# Patient Record
Sex: FEMALE | URBAN - METROPOLITAN AREA
[De-identification: names, ages, dates, MRNs, and addresses within clinical notes are randomized per-mention and may not be internally consistent; named-entity substitution may affect disease eponyms.]

---

## 2023-05-23 ENCOUNTER — APPOINTMENT (OUTPATIENT)
Dept: MEDICAL GROUP | Facility: LAB | Age: 33
End: 2023-05-23

## 2023-06-16 ENCOUNTER — APPOINTMENT (RX ONLY)
Dept: URBAN - METROPOLITAN AREA CLINIC 15 | Facility: CLINIC | Age: 33
Setting detail: DERMATOLOGY
End: 2023-06-16

## 2023-06-16 DIAGNOSIS — L20.89 OTHER ATOPIC DERMATITIS: ICD-10-CM

## 2023-06-16 DIAGNOSIS — L30.4 ERYTHEMA INTERTRIGO: ICD-10-CM

## 2023-06-16 DIAGNOSIS — L60.3 NAIL DYSTROPHY: ICD-10-CM

## 2023-06-16 PROBLEM — L30.9 DERMATITIS, UNSPECIFIED: Status: ACTIVE | Noted: 2023-06-16

## 2023-06-16 PROCEDURE — ? ADDITIONAL NOTES

## 2023-06-16 PROCEDURE — ? PRESCRIPTION

## 2023-06-16 PROCEDURE — 99203 OFFICE O/P NEW LOW 30 MIN: CPT

## 2023-06-16 PROCEDURE — ? COUNSELING

## 2023-06-16 RX ORDER — TRIAMCINOLONE ACETONIDE 1 MG/G
1 OINTMENT TOPICAL BID
Qty: 30 | Refills: 1 | Status: ERX | COMMUNITY
Start: 2023-06-16

## 2023-06-16 RX ADMIN — TRIAMCINOLONE ACETONIDE 1: 1 OINTMENT TOPICAL at 00:00

## 2023-06-16 ASSESSMENT — LOCATION ZONE DERM
LOCATION ZONE: TOENAIL
LOCATION ZONE: TRUNK

## 2023-06-16 ASSESSMENT — LOCATION DETAILED DESCRIPTION DERM
LOCATION DETAILED: LEFT GREAT TOENAIL
LOCATION DETAILED: RIGHT INFRAMAMMARY CREASE (INNER QUADRANT)
LOCATION DETAILED: RIGHT MEDIAL BREAST 3-4:00 REGION
LOCATION DETAILED: LEFT MEDIAL BREAST 10-11:00 REGION
LOCATION DETAILED: LEFT INFRAMAMMARY CREASE (INNER QUADRANT)

## 2023-06-16 ASSESSMENT — LOCATION SIMPLE DESCRIPTION DERM
LOCATION SIMPLE: LEFT BREAST
LOCATION SIMPLE: LEFT GREAT TOE
LOCATION SIMPLE: RIGHT BREAST

## 2023-06-16 ASSESSMENT — SEVERITY ASSESSMENT 2020: SEVERITY 2020: MODERATE

## 2023-06-16 NOTE — PROCEDURE: ADDITIONAL NOTES
Additional Notes: Problem could be due to irritant from moisture from breast milk or possibly contact dermatitis from the ointment being applied\\nAdvised to change pads once moist\\n Stop natural product and only use Vaseline \\nApply steroid after feeding or wipe off prior to breast feeding
Render Risk Assessment In Note?: no
Detail Level: Detailed
Additional Notes: Likely from recent polish

## 2023-11-20 ENCOUNTER — OFFICE VISIT (OUTPATIENT)
Dept: MEDICAL GROUP | Facility: LAB | Age: 33
End: 2023-11-20
Payer: COMMERCIAL

## 2023-11-20 VITALS
RESPIRATION RATE: 16 BRPM | WEIGHT: 145.06 LBS | OXYGEN SATURATION: 99 % | DIASTOLIC BLOOD PRESSURE: 60 MMHG | HEIGHT: 68 IN | TEMPERATURE: 96.7 F | HEART RATE: 80 BPM | SYSTOLIC BLOOD PRESSURE: 118 MMHG | BODY MASS INDEX: 21.99 KG/M2

## 2023-11-20 DIAGNOSIS — Z78.9 VEGAN DIET: ICD-10-CM

## 2023-11-20 DIAGNOSIS — R76.8 ELEVATED ANTINUCLEAR ANTIBODY (ANA) LEVEL: ICD-10-CM

## 2023-11-20 DIAGNOSIS — Z13.29 SCREENING FOR THYROID DISORDER: ICD-10-CM

## 2023-11-20 DIAGNOSIS — Z13.220 LIPID SCREENING: ICD-10-CM

## 2023-11-20 DIAGNOSIS — Z83.49 FAMILY HISTORY OF HEMOCHROMATOSIS: ICD-10-CM

## 2023-11-20 DIAGNOSIS — Z76.89 ENCOUNTER TO ESTABLISH CARE: ICD-10-CM

## 2023-11-20 PROCEDURE — 99203 OFFICE O/P NEW LOW 30 MIN: CPT | Performed by: PHYSICIAN ASSISTANT

## 2023-11-20 PROCEDURE — 3074F SYST BP LT 130 MM HG: CPT | Performed by: PHYSICIAN ASSISTANT

## 2023-11-20 PROCEDURE — 3078F DIAST BP <80 MM HG: CPT | Performed by: PHYSICIAN ASSISTANT

## 2023-11-20 ASSESSMENT — PATIENT HEALTH QUESTIONNAIRE - PHQ9: CLINICAL INTERPRETATION OF PHQ2 SCORE: 0

## 2023-11-20 NOTE — PROGRESS NOTES
"Subjective:     CC:  There were no encounter diagnoses.    HISTORY OF THE PRESENT ILLNESS: Patient is a 33 y.o. female. This pleasant patient is here today to establish care. His/her prior PCP was Ladan Hernandez.    Gave Birth 2022    -vaginal delivery without complications  -still breast feeding       Family hx of Hemochromatosis  -both parents are carriers  -sister dx    Health Maintenance     - Dyslipidemia (30-45): ordered  - Diabetes (HTN, HLD, BMI >25): ordered  - Depression screening (PHQ-2 and/or PHQ-9): neg  - Dental: UTD  - Eye: due  Diet: Recent diet change - daughter has cow milk protein/soy allergy. Vegan and soy free diet  Exercise: sedentary, some walking   Sleep: altered due to breast feeding schedule  Substance Use: denies  Tobacco Use/counseling: denies  Safe in relationship: yes  Seat belts, bike helmet, gun safety discussed.  Sun protection used.       Cancer screening  - Cervical CA (21-65): due   -  HPV+ , clear on repeat pap   - Skin cancer screening: practices sun avoidance     Infectious disease screening/Immunizations  --STI/HIV Screening: completed  --Hepatitis C Screening (18 to 78 yo): completed  --Immunizations: UTD, will check records for Hep B      No current Epic-ordered outpatient medications on file.     No current Epic-ordered facility-administered medications on file.       Health Maintenance: Completed    ROS:   Gen: no fevers/chills, no changes in weight  Eyes: no changes in vision  ENT: no sore throat, no hearing loss, no bloody nose  Pulm: no sob, no cough  CV: no chest pain, no palpitations  GI: no nausea/vomiting, no diarrhea  : no dysuria  MSk: no myalgias  Skin: no rash  Neuro: no headaches, no numbness/tingling  Heme/Lymph: no easy bruising      Objective:       Exam: /60 (BP Location: Right arm, Patient Position: Sitting, BP Cuff Size: Adult)   Pulse 80   Temp 35.9 °C (96.7 °F) (Temporal)   Resp 16   Ht 1.727 m (5' 8\")   Wt 65.8 kg (145 " lb 1 oz)   SpO2 99%  Body mass index is 22.06 kg/m².    General: Normal appearing. No distress.  HEENT: Normocephalic. Eyes conjunctiva clear lids without ptosis, pupils equal and reactive to light accommodation, ears normal shape and contour, canals are clear bilaterally, tympanic membranes are benign, nasal mucosa benign, oropharynx is without erythema, edema or exudates.   Neck: Supple without JVD or bruit. Thyroid is not enlarged.  Pulmonary: Clear to ausculation.  Normal effort. No rales, ronchi, or wheezing.  Cardiovascular: Regular rate and rhythm without murmur. Carotid and radial pulses are intact and equal bilaterally.  Abdomen: Soft, nontender, nondistended. Normal bowel sounds. Liver and spleen are not palpable  Neurologic: Grossly nonfocal  Lymph: No cervical or supraclavicular lymph nodes are palpable  Skin: Warm and dry.  No obvious lesions.  Musculoskeletal: Normal gait. No extremity cyanosis, clubbing, or edema.  Psych: Normal mood and affect. Alert and oriented x3. Judgment and insight is normal.      Assessment & Plan:   33 y.o. female with the following -    1. Encounter to establish care  Labs per orders  Vaccinations per orders  Screenings per orders      2. Elevated antinuclear antibody (JACOBO) level  Chronic condition, previous autoimmune work-up negative  - JACOBO W/REFLEX IF POSITIVE    3. Family history of hemochromatosis  - IRON/TOTAL IRON BIND; Future  - FERRITIN; Future    4. Vegan diet  - VITAMIN D,25 HYDROXY (DEFICIENCY); Future  - VIT B12,  FOLIC ACID  - IRON/TOTAL IRON BIND; Future  - FERRITIN; Future    5. Screening for thyroid disorder  - TSH WITH REFLEX TO FT4; Future    6. Lipid screening  - CBC WITH DIFFERENTIAL; Future  - Comp Metabolic Panel; Future  - Lipid Profile; Future      I spent a total of 21 minutes with record review, exam, communication with the patient, communication with other providers, and documentation of this encounter.    No follow-ups on file.    Please note  that this dictation was created using voice recognition software. I have made every reasonable attempt to correct obvious errors, but I expect that there are errors of grammar and possibly content that I did not discover before finalizing the note.

## 2024-02-10 ENCOUNTER — HOSPITAL ENCOUNTER (OUTPATIENT)
Dept: LAB | Facility: MEDICAL CENTER | Age: 34
End: 2024-02-10
Attending: PHYSICIAN ASSISTANT
Payer: COMMERCIAL

## 2024-02-10 DIAGNOSIS — Z13.29 SCREENING FOR THYROID DISORDER: ICD-10-CM

## 2024-02-10 DIAGNOSIS — Z13.220 LIPID SCREENING: ICD-10-CM

## 2024-02-10 DIAGNOSIS — Z83.49 FAMILY HISTORY OF HEMOCHROMATOSIS: ICD-10-CM

## 2024-02-10 DIAGNOSIS — Z78.9 VEGAN DIET: ICD-10-CM

## 2024-02-10 LAB
25(OH)D3 SERPL-MCNC: 32 NG/ML (ref 30–100)
ALBUMIN SERPL BCP-MCNC: 4.4 G/DL (ref 3.2–4.9)
ALBUMIN/GLOB SERPL: 1.7 G/DL
ALP SERPL-CCNC: 75 U/L (ref 30–99)
ALT SERPL-CCNC: 9 U/L (ref 2–50)
ANION GAP SERPL CALC-SCNC: 11 MMOL/L (ref 7–16)
AST SERPL-CCNC: 13 U/L (ref 12–45)
BASOPHILS # BLD AUTO: 1 % (ref 0–1.8)
BASOPHILS # BLD: 0.04 K/UL (ref 0–0.12)
BILIRUB SERPL-MCNC: 0.4 MG/DL (ref 0.1–1.5)
BUN SERPL-MCNC: 12 MG/DL (ref 8–22)
CALCIUM ALBUM COR SERPL-MCNC: 8.9 MG/DL (ref 8.5–10.5)
CALCIUM SERPL-MCNC: 9.2 MG/DL (ref 8.5–10.5)
CHLORIDE SERPL-SCNC: 108 MMOL/L (ref 96–112)
CHOLEST SERPL-MCNC: 138 MG/DL (ref 100–199)
CO2 SERPL-SCNC: 22 MMOL/L (ref 20–33)
CREAT SERPL-MCNC: 0.63 MG/DL (ref 0.5–1.4)
EOSINOPHIL # BLD AUTO: 0.21 K/UL (ref 0–0.51)
EOSINOPHIL NFR BLD: 5.1 % (ref 0–6.9)
ERYTHROCYTE [DISTWIDTH] IN BLOOD BY AUTOMATED COUNT: 38.8 FL (ref 35.9–50)
FERRITIN SERPL-MCNC: 19.6 NG/ML (ref 10–291)
FOLATE SERPL-MCNC: 28.2 NG/ML
GFR SERPLBLD CREATININE-BSD FMLA CKD-EPI: 121 ML/MIN/1.73 M 2
GLOBULIN SER CALC-MCNC: 2.6 G/DL (ref 1.9–3.5)
GLUCOSE SERPL-MCNC: 81 MG/DL (ref 65–99)
HCT VFR BLD AUTO: 43.7 % (ref 37–47)
HDLC SERPL-MCNC: 73 MG/DL
HGB BLD-MCNC: 14.7 G/DL (ref 12–16)
IMM GRANULOCYTES # BLD AUTO: 0.01 K/UL (ref 0–0.11)
IMM GRANULOCYTES NFR BLD AUTO: 0.2 % (ref 0–0.9)
IRON SATN MFR SERPL: 33 % (ref 15–55)
IRON SERPL-MCNC: 87 UG/DL (ref 40–170)
LDLC SERPL CALC-MCNC: 59 MG/DL
LYMPHOCYTES # BLD AUTO: 1.66 K/UL (ref 1–4.8)
LYMPHOCYTES NFR BLD: 40.4 % (ref 22–41)
MCH RBC QN AUTO: 28.9 PG (ref 27–33)
MCHC RBC AUTO-ENTMCNC: 33.6 G/DL (ref 32.2–35.5)
MCV RBC AUTO: 85.9 FL (ref 81.4–97.8)
MONOCYTES # BLD AUTO: 0.34 K/UL (ref 0–0.85)
MONOCYTES NFR BLD AUTO: 8.3 % (ref 0–13.4)
NEUTROPHILS # BLD AUTO: 1.85 K/UL (ref 1.82–7.42)
NEUTROPHILS NFR BLD: 45 % (ref 44–72)
NRBC # BLD AUTO: 0 K/UL
NRBC BLD-RTO: 0 /100 WBC (ref 0–0.2)
PLATELET # BLD AUTO: 271 K/UL (ref 164–446)
PMV BLD AUTO: 9.3 FL (ref 9–12.9)
POTASSIUM SERPL-SCNC: 4.9 MMOL/L (ref 3.6–5.5)
PROT SERPL-MCNC: 7 G/DL (ref 6–8.2)
RBC # BLD AUTO: 5.09 M/UL (ref 4.2–5.4)
SODIUM SERPL-SCNC: 141 MMOL/L (ref 135–145)
TIBC SERPL-MCNC: 266 UG/DL (ref 250–450)
TRIGL SERPL-MCNC: 30 MG/DL (ref 0–149)
TSH SERPL DL<=0.005 MIU/L-ACNC: 1.67 UIU/ML (ref 0.38–5.33)
UIBC SERPL-MCNC: 179 UG/DL (ref 110–370)
VIT B12 SERPL-MCNC: 326 PG/ML (ref 211–911)
WBC # BLD AUTO: 4.1 K/UL (ref 4.8–10.8)

## 2024-02-10 PROCEDURE — 84443 ASSAY THYROID STIM HORMONE: CPT

## 2024-02-10 PROCEDURE — 85025 COMPLETE CBC W/AUTO DIFF WBC: CPT

## 2024-02-10 PROCEDURE — 83540 ASSAY OF IRON: CPT

## 2024-02-10 PROCEDURE — 83550 IRON BINDING TEST: CPT

## 2024-02-10 PROCEDURE — 82306 VITAMIN D 25 HYDROXY: CPT

## 2024-02-10 PROCEDURE — 82607 VITAMIN B-12: CPT

## 2024-02-10 PROCEDURE — 82728 ASSAY OF FERRITIN: CPT

## 2024-02-10 PROCEDURE — 80061 LIPID PANEL: CPT

## 2024-02-10 PROCEDURE — 36415 COLL VENOUS BLD VENIPUNCTURE: CPT

## 2024-02-10 PROCEDURE — 80053 COMPREHEN METABOLIC PANEL: CPT

## 2024-02-10 PROCEDURE — 86038 ANTINUCLEAR ANTIBODIES: CPT

## 2024-02-10 PROCEDURE — 82746 ASSAY OF FOLIC ACID SERUM: CPT

## 2024-02-12 LAB — NUCLEAR IGG SER QL IA: NORMAL

## 2024-05-10 ENCOUNTER — OFFICE VISIT (OUTPATIENT)
Dept: MEDICAL GROUP | Facility: LAB | Age: 34
End: 2024-05-10
Payer: COMMERCIAL

## 2024-05-10 VITALS
OXYGEN SATURATION: 98 % | HEIGHT: 68 IN | TEMPERATURE: 96.9 F | WEIGHT: 136.24 LBS | SYSTOLIC BLOOD PRESSURE: 120 MMHG | BODY MASS INDEX: 20.65 KG/M2 | RESPIRATION RATE: 16 BRPM | DIASTOLIC BLOOD PRESSURE: 60 MMHG | HEART RATE: 81 BPM

## 2024-05-10 DIAGNOSIS — K21.9 GASTROESOPHAGEAL REFLUX DISEASE, UNSPECIFIED WHETHER ESOPHAGITIS PRESENT: ICD-10-CM

## 2024-05-10 PROCEDURE — 99213 OFFICE O/P EST LOW 20 MIN: CPT | Performed by: PHYSICIAN ASSISTANT

## 2024-05-10 PROCEDURE — 3078F DIAST BP <80 MM HG: CPT | Performed by: PHYSICIAN ASSISTANT

## 2024-05-10 PROCEDURE — 3074F SYST BP LT 130 MM HG: CPT | Performed by: PHYSICIAN ASSISTANT

## 2024-05-10 RX ORDER — OMEPRAZOLE 20 MG/1
20 CAPSULE, DELAYED RELEASE ORAL DAILY
Qty: 14 CAPSULE | Refills: 0 | Status: SHIPPED | OUTPATIENT
Start: 2024-05-10 | End: 2024-05-24

## 2024-05-10 ASSESSMENT — PATIENT HEALTH QUESTIONNAIRE - PHQ9: CLINICAL INTERPRETATION OF PHQ2 SCORE: 0

## 2024-05-10 ASSESSMENT — FIBROSIS 4 INDEX: FIB4 SCORE: 0.51

## 2024-05-10 NOTE — PROGRESS NOTES
Subjective:     CC: back pain    HPI:   Jyotsna here today with     Verbal consent was acquired by the patient to use [employer] ambient listening note generation during this visit Yes       History of Present Illness      The patient reports experiencing back pain, which radiates through her shoulder blade, arm, chest, ribs, and up to her ears. She has previously sought chiropractic intervention for this issue, which typically provides relief. However, the frequency of these episodes has decreased over the past 2 weeks. Over the past month, she has experienced three such episodes. She also reports severe jaw pain, which she initially attributed to jaw clenching.     She has been engaging in regular walking and weight lifting exercises without developing symptoms. She has not attempted any over-the-counter medications for pain. She took Tums one night for heartburn and indigestion, which provided some relief. The pain is not associated with meals, but is worse at night. She has not undergone any imaging.   She denies fevers, chills, or vomiting, but reports nausea. She denies any changes in stools like dark or bloody stools, but reports irregular bowel movements and occasional cramping-like stomach pain. She is not currently taking any medications or supplements. She also reports gas pain. She reports increased sugar intake and increased salt intake which did seem to exacerbate symptoms. She is still breastfeeding.          ROS:  Gen: no fevers/chills, no changes in weight  Eyes: no changes in vision  ENT: no sore throat, no hearing loss, no bloody nose  Pulm: no sob, no cough  CV: no chest pain, no palpitations  GI: no nausea/vomiting, no diarrhea  : no dysuria  MSk: no myalgias  Skin: no rash  Neuro: no headaches, no numbness/tingling  Heme/Lymph: no easy bruising    Current Outpatient Medications Ordered in Epic   Medication Sig Dispense Refill    omeprazole (PRILOSEC) 20 MG delayed-release capsule Take 1  "Capsule by mouth every day for 14 days. 14 Capsule 0     No current Epic-ordered facility-administered medications on file.         Objective:     Exam:  /60 (BP Location: Left arm, Patient Position: Sitting, BP Cuff Size: Adult)   Pulse 81   Temp 36.1 °C (96.9 °F) (Temporal)   Resp 16   Ht 1.727 m (5' 8\")   Wt 61.8 kg (136 lb 3.9 oz)   SpO2 98%   BMI 20.72 kg/m²  Body mass index is 20.72 kg/m².    Gen: Alert and oriented, No apparent distress.  Neck: Neck is supple without lymphadenopathy.  Lungs: Normal effort, CTA bilaterally, no wheezes, rhonchi, or rales  CV: Regular rate and rhythm. No murmurs, rubs, or gallops.  Ext: No clubbing, cyanosis, edema.  ABD: Soft, nontender, nondistended, bowel sounds present in all 4 quadrants, Nair sign negative, no palpable masses, no rebound tenderness      Assessment & Plan:     33 y.o. female with the following -     Problem List Items Addressed This Visit    None  Visit Diagnoses       Gastroesophageal reflux disease, unspecified whether esophagitis present        Relevant Medications    omeprazole (PRILOSEC) 20 MG delayed-release capsule          Assessment & Plan  1. Back pain.  The patient's symptoms do not align with a cardiac etiology, suggesting a more probable diagnosis of Gastroesophageal Reflux Disease (GERD). A two-week course of omeprazole has been prescribed to alleviate the production of acid reflux. The patient has been advised to persist with dietary modifications. Should there be no improvement in the patient's condition, further investigation into musculoskeletal causes will be considered.          I spent a total of 25 minutes with record review, exam, communication with the patient, communication with other providers, and documentation of this encounter.      No follow-ups on file.    Please note that this dictation was created using voice recognition software. I have made every reasonable attempt to correct obvious errors, but there may be " errors of grammar and possibly content that I did not discover before finalizing the note.

## 2024-07-02 ENCOUNTER — APPOINTMENT (RX ONLY)
Dept: URBAN - METROPOLITAN AREA CLINIC 6 | Facility: CLINIC | Age: 34
Setting detail: DERMATOLOGY
End: 2024-07-02

## 2024-07-02 DIAGNOSIS — Z71.89 OTHER SPECIFIED COUNSELING: ICD-10-CM

## 2024-07-02 DIAGNOSIS — L71.8 OTHER ROSACEA: ICD-10-CM | Status: INADEQUATELY CONTROLLED

## 2024-07-02 PROCEDURE — ? SUNSCREEN TREATMENT REGIMEN

## 2024-07-02 PROCEDURE — 99214 OFFICE O/P EST MOD 30 MIN: CPT

## 2024-07-02 PROCEDURE — ? PRESCRIPTION

## 2024-07-02 PROCEDURE — ? PRESCRIPTION MEDICATION MANAGEMENT

## 2024-07-02 PROCEDURE — ? COUNSELING

## 2024-07-02 RX ORDER — IVERMECTIN 10 MG/G
1 CREAM TOPICAL DAILY
Qty: 45 | Refills: 6 | Status: ERX | COMMUNITY
Start: 2024-07-02

## 2024-07-02 RX ADMIN — IVERMECTIN 1: 10 CREAM TOPICAL at 00:00

## 2024-07-02 ASSESSMENT — LOCATION SIMPLE DESCRIPTION DERM
LOCATION SIMPLE: LEFT CHEEK
LOCATION SIMPLE: RIGHT HAND
LOCATION SIMPLE: LEFT FOREHEAD
LOCATION SIMPLE: LEFT HAND
LOCATION SIMPLE: RIGHT CHEEK

## 2024-07-02 ASSESSMENT — LOCATION ZONE DERM
LOCATION ZONE: FACE
LOCATION ZONE: HAND

## 2024-07-02 ASSESSMENT — LOCATION DETAILED DESCRIPTION DERM
LOCATION DETAILED: RIGHT MEDIAL MALAR CHEEK
LOCATION DETAILED: LEFT INFERIOR FOREHEAD
LOCATION DETAILED: LEFT RADIAL DORSAL HAND
LOCATION DETAILED: LEFT MEDIAL MALAR CHEEK
LOCATION DETAILED: RIGHT RADIAL DORSAL HAND

## 2024-07-02 NOTE — PROCEDURE: PRESCRIPTION MEDICATION MANAGEMENT
Initiate Treatment: Soolantra 1 % topical Cream Daily
Render In Strict Bullet Format?: No
Continue Regimen: Metronidazole 0.75 % Topical Cream daily to BID if she would prefer (pt has medication at home)
Plan: Recommended OTC Azelaic Acid\\nRecommended LaRoche Posay Toleriane, or Prequel\\nDiscussed triggers\\nWill send compounded ivermectin/oxymetazoline if needed\\nConsider adding doxy if flare worsens
Detail Level: Zone

## 2025-03-25 ENCOUNTER — APPOINTMENT (OUTPATIENT)
Dept: URBAN - METROPOLITAN AREA CLINIC 6 | Facility: CLINIC | Age: 35
Setting detail: DERMATOLOGY
End: 2025-03-25

## 2025-03-25 DIAGNOSIS — L71.8 OTHER ROSACEA: ICD-10-CM

## 2025-03-25 DIAGNOSIS — Z71.89 OTHER SPECIFIED COUNSELING: ICD-10-CM

## 2025-03-25 PROCEDURE — ? PRESCRIPTION MEDICATION MANAGEMENT

## 2025-03-25 PROCEDURE — 99214 OFFICE O/P EST MOD 30 MIN: CPT

## 2025-03-25 PROCEDURE — ? PRESCRIPTION

## 2025-03-25 PROCEDURE — ? SUNSCREEN TREATMENT REGIMEN

## 2025-03-25 PROCEDURE — ? COUNSELING

## 2025-03-25 RX ORDER — IVERMECTIN 10 MG/G
CREAM TOPICAL DAILY
Qty: 45 | Refills: 6 | Status: ERX

## 2025-03-25 ASSESSMENT — LOCATION SIMPLE DESCRIPTION DERM
LOCATION SIMPLE: RIGHT HAND
LOCATION SIMPLE: RIGHT CHEEK
LOCATION SIMPLE: LEFT CHEEK
LOCATION SIMPLE: LEFT HAND
LOCATION SIMPLE: LEFT FOREHEAD

## 2025-03-25 ASSESSMENT — LOCATION DETAILED DESCRIPTION DERM
LOCATION DETAILED: LEFT RADIAL DORSAL HAND
LOCATION DETAILED: RIGHT MEDIAL MALAR CHEEK
LOCATION DETAILED: RIGHT RADIAL DORSAL HAND
LOCATION DETAILED: LEFT MEDIAL MALAR CHEEK
LOCATION DETAILED: LEFT INFERIOR FOREHEAD

## 2025-03-25 ASSESSMENT — LOCATION ZONE DERM
LOCATION ZONE: HAND
LOCATION ZONE: FACE

## 2025-03-25 NOTE — PROCEDURE: PRESCRIPTION MEDICATION MANAGEMENT
Initiate Treatment: Soolantra 1 % topical Cream Daily
Render In Strict Bullet Format?: No
Plan: Recommended OTC Azelaic Acid\\nRecommended Avene Tolerance\\nDiscussed triggers (dieatary, environmental)\\nConsider adding doxy if flare worsens
Detail Level: Zone